# Patient Record
Sex: FEMALE
[De-identification: names, ages, dates, MRNs, and addresses within clinical notes are randomized per-mention and may not be internally consistent; named-entity substitution may affect disease eponyms.]

---

## 2024-09-18 ENCOUNTER — DASHBOARD ENCOUNTERS (OUTPATIENT)
Age: 34
End: 2024-09-18

## 2024-09-18 ENCOUNTER — OFFICE VISIT (OUTPATIENT)
Dept: URBAN - METROPOLITAN AREA CLINIC 2 | Facility: CLINIC | Age: 34
End: 2024-09-18
Payer: COMMERCIAL

## 2024-09-18 VITALS
SYSTOLIC BLOOD PRESSURE: 102 MMHG | HEIGHT: 70 IN | WEIGHT: 190.2 LBS | TEMPERATURE: 97.9 F | HEART RATE: 70 BPM | DIASTOLIC BLOOD PRESSURE: 66 MMHG | BODY MASS INDEX: 27.23 KG/M2

## 2024-09-18 DIAGNOSIS — K90.0 CELIAC DISEASE: ICD-10-CM

## 2024-09-18 PROBLEM — 396331005: Status: ACTIVE | Noted: 2024-09-18

## 2024-09-18 PROCEDURE — 99203 OFFICE O/P NEW LOW 30 MIN: CPT | Performed by: NURSE PRACTITIONER

## 2024-09-18 PROCEDURE — 99243 OFF/OP CNSLTJ NEW/EST LOW 30: CPT | Performed by: NURSE PRACTITIONER

## 2024-09-18 RX ORDER — HYDROXYZINE HYDROCHLORIDE 25 MG/1
TAKE ONE TABLET BY MOUTH ONE TIME DAILY AT BEDTIME TABLET, FILM COATED ORAL
Qty: 30 UNSPECIFIED | Refills: 0 | Status: ACTIVE | COMMUNITY

## 2024-09-18 RX ORDER — PROPRANOLOL HYDROCHLORIDE 10 MG/1
TAKE ONE TABLET BY MOUTH THREE TIMES A DAY AS NEEDED FOR ANXIETY TABLET ORAL
Qty: 90 UNSPECIFIED | Refills: 0 | Status: ACTIVE | COMMUNITY

## 2024-09-18 RX ORDER — LORATADINE 10 MG/1
1 TABLET TABLET ORAL ONCE A DAY
Status: ACTIVE | COMMUNITY

## 2024-09-18 RX ORDER — ALBUTEROL SULFATE 108 UG/1
INHALE TWO PUFFS BY MOUTH FOUR TIMES A DAY AS NEEDED FOR SHORTNESS OF BREATH OR WHEEZING AEROSOL, METERED RESPIRATORY (INHALATION)
Qty: 6.7 UNSPECIFIED | Refills: 0 | Status: ACTIVE | COMMUNITY

## 2024-09-18 RX ORDER — MULTIVITAMIN
1 TABLET TABLET ORAL ONCE A DAY
Status: ACTIVE | COMMUNITY

## 2024-09-18 NOTE — HPI-TODAY'S VISIT:
This patient was referred by Dr. Romnee Reyes to establish care for hx of celiac disease.  A copy of this will be sent to the referring provider.  Patient was diagnosed with celiac in 2018 with positive serology and positive EGD with duodenal biopsies.  She eats mostly gluten-free with a few cross-contamination.  She also has some mild IBS that is exacerbated by dairy which she is trying to avoid as well.  She denies any abdominal pain or bloating or constipation.

## 2024-09-19 LAB
A/G RATIO: 1.6
ALBUMIN: 4.2
ALKALINE PHOSPHATASE: 86
ALT (SGPT): 39
AST (SGOT): 34
BILIRUBIN, TOTAL: 0.6
BUN/CREATININE RATIO: (no result)
BUN: 10
CALCIUM: 9.4
CARBON DIOXIDE, TOTAL: 29
CHLORIDE: 107
CREATININE: 0.62
EGFR: 122
GLOBULIN, TOTAL: 2.7
GLUCOSE: 88
HEMATOCRIT: 42.7
HEMOGLOBIN: 14.4
MCH: 31.5
MCHC: 33.7
MCV: 93.4
MPV: 9.6
PLATELET COUNT: 280
POTASSIUM: 4.5
PROTEIN, TOTAL: 6.9
RDW: 12
RED BLOOD CELL COUNT: 4.57
SODIUM: 141
VITAMIN D,25-OH,TOTAL,IA: 40
WHITE BLOOD CELL COUNT: 6.2

## 2025-03-05 ENCOUNTER — OFFICE VISIT (OUTPATIENT)
Dept: URBAN - METROPOLITAN AREA CLINIC 2 | Facility: CLINIC | Age: 35
End: 2025-03-05

## 2025-03-13 ENCOUNTER — OFFICE VISIT (OUTPATIENT)
Dept: URBAN - METROPOLITAN AREA CLINIC 2 | Facility: CLINIC | Age: 35
End: 2025-03-13

## 2025-03-19 ENCOUNTER — OFFICE VISIT (OUTPATIENT)
Dept: URBAN - METROPOLITAN AREA CLINIC 2 | Facility: CLINIC | Age: 35
End: 2025-03-19
Payer: COMMERCIAL

## 2025-03-19 ENCOUNTER — LAB OUTSIDE AN ENCOUNTER (OUTPATIENT)
Dept: URBAN - METROPOLITAN AREA CLINIC 2 | Facility: CLINIC | Age: 35
End: 2025-03-19

## 2025-03-19 ENCOUNTER — LAB OUTSIDE AN ENCOUNTER (OUTPATIENT)
Dept: URBAN - METROPOLITAN AREA CLINIC 80 | Facility: CLINIC | Age: 35
End: 2025-03-19

## 2025-03-19 VITALS
HEART RATE: 60 BPM | TEMPERATURE: 98.2 F | SYSTOLIC BLOOD PRESSURE: 107 MMHG | BODY MASS INDEX: 27.52 KG/M2 | DIASTOLIC BLOOD PRESSURE: 63 MMHG | HEIGHT: 70 IN | WEIGHT: 192.2 LBS

## 2025-03-19 DIAGNOSIS — R19.03 ABDOMINAL OR PELVIC SWELLING, MASS, OR LUMP, RIGHT LOWER QUADRANT: ICD-10-CM

## 2025-03-19 DIAGNOSIS — R10.31 RLQ ABDOMINAL PAIN: ICD-10-CM

## 2025-03-19 DIAGNOSIS — R74.8 ELEVATED LIVER ENZYMES: ICD-10-CM

## 2025-03-19 PROCEDURE — 99204 OFFICE O/P NEW MOD 45 MIN: CPT | Performed by: NURSE PRACTITIONER

## 2025-03-19 RX ORDER — HYDROXYZINE HYDROCHLORIDE 25 MG/1
TAKE ONE TABLET BY MOUTH ONE TIME DAILY AT BEDTIME TABLET, FILM COATED ORAL
Qty: 30 UNSPECIFIED | Refills: 0 | Status: ACTIVE | COMMUNITY

## 2025-03-19 RX ORDER — ALBUTEROL SULFATE 108 UG/1
INHALE TWO PUFFS BY MOUTH FOUR TIMES A DAY AS NEEDED FOR SHORTNESS OF BREATH OR WHEEZING AEROSOL, METERED RESPIRATORY (INHALATION)
Qty: 6.7 UNSPECIFIED | Refills: 0 | Status: ACTIVE | COMMUNITY

## 2025-03-19 RX ORDER — MULTIVITAMIN
1 TABLET TABLET ORAL ONCE A DAY
Status: ACTIVE | COMMUNITY

## 2025-03-19 RX ORDER — PROPRANOLOL HYDROCHLORIDE 10 MG/1
TAKE ONE TABLET BY MOUTH THREE TIMES A DAY AS NEEDED FOR ANXIETY TABLET ORAL
Qty: 90 UNSPECIFIED | Refills: 0 | Status: ACTIVE | COMMUNITY

## 2025-03-19 NOTE — PHYSICAL EXAM GASTROINTESTINAL
Abdomen , soft, nontender, nondistended , no guarding or rigidity , pea sizes lump right groin palpable , normal bowel sounds , Liver and Spleen , no hepatomegaly present , no hepatosplenomegaly , liver nontender , spleen not palpable

## 2025-03-19 NOTE — HPI-TODAY'S VISIT:
Very pleasant 34-year-old female seen today for complaints of elevated liver enzymes.  She reports liver enzymes have been elevated the past few times her labs have been checked.  I do not have these to review.  She denies right upper quadrant pain.  She rarely drinks alcohol.  She has had her gallbladder removed.  She also has celiac.  Very makes diarrhea worse.  She also avoids wheat.  She tries to exercise and about 3 weeks ago felt a pain in her right groin.  She feels a lump and is difficult to lie on that side.  Pain has persisted.  She denies constipation. Patient also complains of joint pains especially in her hands.

## 2025-03-20 LAB
ALBUMIN/GLOBULIN RATIO: 1.9
ALBUMIN: 4.4
ALKALINE PHOSPHATASE: 77
ALT: 18
AST: 21
BILIRUBIN, TOTAL: 0.5
BUN/CREATININE RATIO: (no result)
CALCIUM: 9.3
CARBON DIOXIDE: 23
CHLORIDE: 108
CREATININE: 0.64
GLOBULIN: 2.3
GLUCOSE: 78
POTASSIUM: 4.2
PROTEIN, TOTAL: 6.7
SODIUM: 140
UREA NITROGEN (BUN): 10

## 2025-03-27 LAB
ACTIN (SMOOTH MUSCLE) ANTIBODY (IGG): <20
ANA SCREEN, IFA: POSITIVE
ENHANCED LIVER FIBROSIS (ELF) SCORE: 8.42
HBSAG SCREEN: (no result)
HEMATOCRIT: 41.7
HEMOGLOBIN: 14.4
HEP A AB, IGM: (no result)
HEP B CORE AB, IGM: (no result)
HEPATITIS C ANTIBODY: (no result)
MCH: 32
MCHC: 34.5
MCV: 92.7
MITOCHONDRIAL AB SCREEN: NEGATIVE
MPV: 9.6
PLATELET COUNT: 261
RDW: 11.5
RED BLOOD CELL COUNT: 4.5
WHITE BLOOD CELL COUNT: 4.8